# Patient Record
Sex: FEMALE | Race: WHITE | NOT HISPANIC OR LATINO | URBAN - NONMETROPOLITAN AREA
[De-identification: names, ages, dates, MRNs, and addresses within clinical notes are randomized per-mention and may not be internally consistent; named-entity substitution may affect disease eponyms.]

---

## 2019-06-06 ENCOUNTER — IMPORTED ENCOUNTER (OUTPATIENT)
Dept: URBAN - NONMETROPOLITAN AREA CLINIC 1 | Facility: CLINIC | Age: 64
End: 2019-06-06

## 2019-06-06 PROBLEM — H52.03: Noted: 2019-06-06

## 2019-06-06 PROBLEM — H25.13: Noted: 2019-06-06

## 2019-06-06 PROCEDURE — 92014 COMPRE OPH EXAM EST PT 1/>: CPT

## 2019-06-06 PROCEDURE — 92015 DETERMINE REFRACTIVE STATE: CPT

## 2019-06-06 NOTE — PATIENT DISCUSSION
Mild Cataracts OU- Not yet surgical. - Reviewed symptoms of advancing cataract growth such as glare and halos and decreased vision.- Continue to monitor for now. Pt will notify us if any new symptoms develop. Hyperopia OU - New glasses Rx given today; 's Notes: mild cataracts OU

## 2022-04-09 ASSESSMENT — TONOMETRY
OS_IOP_MMHG: 14
OD_IOP_MMHG: 14

## 2022-04-09 ASSESSMENT — VISUAL ACUITY
OS_SC: 20/25-2
OD_SC: 20/25